# Patient Record
Sex: MALE | Race: WHITE | Employment: UNEMPLOYED | URBAN - METROPOLITAN AREA
[De-identification: names, ages, dates, MRNs, and addresses within clinical notes are randomized per-mention and may not be internally consistent; named-entity substitution may affect disease eponyms.]

---

## 2023-03-26 ENCOUNTER — HOSPITAL ENCOUNTER (EMERGENCY)
Age: 33
Discharge: HOME | End: 2023-03-27
Attending: EMERGENCY MEDICINE
Payer: COMMERCIAL

## 2023-03-26 ENCOUNTER — APPOINTMENT (OUTPATIENT)
Dept: GENERAL RADIOLOGY | Age: 33
End: 2023-03-26
Payer: COMMERCIAL

## 2023-03-26 DIAGNOSIS — T14.8XXA BLOOD BLISTER: ICD-10-CM

## 2023-03-26 DIAGNOSIS — M25.571 ACUTE RIGHT ANKLE PAIN: Primary | ICD-10-CM

## 2023-03-26 PROCEDURE — 6370000000 HC RX 637 (ALT 250 FOR IP): Performed by: EMERGENCY MEDICINE

## 2023-03-26 PROCEDURE — 99283 EMERGENCY DEPT VISIT LOW MDM: CPT

## 2023-03-26 PROCEDURE — 73610 X-RAY EXAM OF ANKLE: CPT

## 2023-03-26 PROCEDURE — 73630 X-RAY EXAM OF FOOT: CPT

## 2023-03-26 PROCEDURE — 73590 X-RAY EXAM OF LOWER LEG: CPT

## 2023-03-26 RX ORDER — OXYCODONE HYDROCHLORIDE AND ACETAMINOPHEN 5; 325 MG/1; MG/1
1 TABLET ORAL ONCE
Status: COMPLETED | OUTPATIENT
Start: 2023-03-26 | End: 2023-03-26

## 2023-03-26 RX ADMIN — OXYCODONE AND ACETAMINOPHEN 1 TABLET: 5; 325 TABLET ORAL at 23:06

## 2023-03-27 VITALS
OXYGEN SATURATION: 99 % | DIASTOLIC BLOOD PRESSURE: 74 MMHG | SYSTOLIC BLOOD PRESSURE: 116 MMHG | TEMPERATURE: 98.1 F | HEART RATE: 92 BPM | HEIGHT: 63 IN | BODY MASS INDEX: 19.49 KG/M2 | RESPIRATION RATE: 16 BRPM | WEIGHT: 110 LBS

## 2023-03-27 PROCEDURE — 6370000000 HC RX 637 (ALT 250 FOR IP): Performed by: EMERGENCY MEDICINE

## 2023-03-27 RX ORDER — OXYCODONE HYDROCHLORIDE AND ACETAMINOPHEN 5; 325 MG/1; MG/1
1 TABLET ORAL EVERY 6 HOURS PRN
Qty: 12 TABLET | Refills: 0 | Status: SHIPPED | OUTPATIENT
Start: 2023-03-27 | End: 2023-03-30

## 2023-03-27 RX ORDER — GINSENG 100 MG
CAPSULE ORAL ONCE
Status: COMPLETED | OUTPATIENT
Start: 2023-03-27 | End: 2023-03-27

## 2023-03-27 RX ADMIN — BACITRACIN: 500 OINTMENT TOPICAL at 00:32

## 2023-03-27 ASSESSMENT — PAIN DESCRIPTION - LOCATION: LOCATION: ANKLE

## 2023-03-27 ASSESSMENT — PAIN SCALES - GENERAL: PAINLEVEL_OUTOF10: 2

## 2023-03-27 ASSESSMENT — PAIN - FUNCTIONAL ASSESSMENT: PAIN_FUNCTIONAL_ASSESSMENT: 0-10

## 2023-03-27 ASSESSMENT — PAIN DESCRIPTION - ORIENTATION: ORIENTATION: RIGHT

## 2023-03-27 NOTE — ED PROVIDER NOTES
any change or worsening increased pain redness swelling warmth any new numb tingling sensations. Patient to follow-up with orthopedics as planned in 2 days    PATIENT REFERRED TO:  YOUR SURGEON    In 2 days      DISCHARGE MEDICATIONS:  New Prescriptions    No medications on file     Controlled Substances Monitoring:     No flowsheet data found.     (Please note that portions of this note were completed with a voice recognition program.  Efforts were made to edit the dictations but occasionally words are mis-transcribed.)    Alesia Song DO (electronically signed)  Attending Emergency Physician            Alesia Song DO  03/27/23 0569

## 2024-01-03 ENCOUNTER — HOSPITAL ENCOUNTER (EMERGENCY)
Dept: HOSPITAL 100 - ED | Age: 34
LOS: 1 days | Discharge: PSYCHIATRIC OR SUBSTANCE USE FACILITY | End: 2024-01-04
Payer: MEDICAID

## 2024-01-03 VITALS — OXYGEN SATURATION: 97 % | RESPIRATION RATE: 16 BRPM | HEART RATE: 80 BPM

## 2024-01-03 VITALS — RESPIRATION RATE: 15 BRPM

## 2024-01-03 VITALS
TEMPERATURE: 97.88 F | RESPIRATION RATE: 22 BRPM | DIASTOLIC BLOOD PRESSURE: 93 MMHG | HEART RATE: 83 BPM | OXYGEN SATURATION: 94 % | SYSTOLIC BLOOD PRESSURE: 129 MMHG

## 2024-01-03 VITALS — RESPIRATION RATE: 16 BRPM | HEART RATE: 81 BPM | OXYGEN SATURATION: 97 %

## 2024-01-03 VITALS — RESPIRATION RATE: 14 BRPM

## 2024-01-03 VITALS — BODY MASS INDEX: 20.5 KG/M2

## 2024-01-03 DIAGNOSIS — Z91.51: ICD-10-CM

## 2024-01-03 DIAGNOSIS — T14.91XA: Primary | ICD-10-CM

## 2024-01-03 DIAGNOSIS — F17.210: ICD-10-CM

## 2024-01-03 DIAGNOSIS — F31.9: ICD-10-CM

## 2024-01-03 LAB
ALCOHOL, BLOOD (MEDICAL)-SERUM: 3 MG/DL
AMPHET UR-MCNC: POSITIVE NG/ML
ANION GAP: 2 (ref 5–15)
BARBITURATE URINE VISTA: NEGATIVE
BENZODIAZEPINE URINE VISTA: NEGATIVE
BUN SERPL-MCNC: 17 MG/DL (ref 7–18)
BUN/CREAT RATIO: 17.8 RATIO (ref 10–20)
CALCIUM SERPL-MCNC: 9.3 MG/DL (ref 8.5–10.1)
CARBON DIOXIDE: 28 MMOL/L (ref 21–32)
CHLORIDE: 109 MMOL/L (ref 98–107)
COCAINE URINE VISTA: NEGATIVE
DEPRECATED RDW RBC: 44.7 FL (ref 35.1–43.9)
DRUG CONFIRMATION TO FOLLOW?: (no result)
ECSTACY URINE VISTA: NEGATIVE
ERYTHROCYTE [DISTWIDTH] IN BLOOD: 14.5 % (ref 11.6–14.6)
EST GLOM FILT RATE - AFR AMER: 115 ML/MIN (ref 60–?)
ESTIMATED CREATININE CLEARANCE: 79.75 ML/MIN
GLUCOSE: 92 MG/DL (ref 74–106)
HCT VFR BLD AUTO: 40 % (ref 40–54)
HGB BLD-MCNC: 13 G/DL (ref 13–16.5)
IMMATURE GRANULOCYTES COUNT: 0.03 X10^3/UL (ref 0–0)
MCV RBC: 84.9 FL (ref 80–94)
MEAN CORP HGB CONC: 32.5 G/DL (ref 32–36)
MEAN PLATELET VOL.: 9.5 FL (ref 6.2–12)
METHADONE URINE VISTA: NEGATIVE
NRBC FLAGGED BY ANALYZER: 0 % (ref 0–5)
PCP UR QL: NEGATIVE
PH UR: 5 [PH]
PLATELET # BLD: 330 K/MM3 (ref 150–450)
POTASSIUM: 4.3 MMOL/L (ref 3.5–5.1)
RBC # BLD AUTO: 4.71 M/MM3 (ref 4.6–6.2)
THC URINE VISTA: POSITIVE
WBC # BLD AUTO: 9.5 K/MM3 (ref 4.4–11)

## 2024-01-03 PROCEDURE — 87811 SARS-COV-2 COVID19 W/OPTIC: CPT

## 2024-01-03 PROCEDURE — 80048 BASIC METABOLIC PNL TOTAL CA: CPT

## 2024-01-03 PROCEDURE — 82550 ASSAY OF CK (CPK): CPT

## 2024-01-03 PROCEDURE — 85025 COMPLETE CBC W/AUTO DIFF WBC: CPT

## 2024-01-03 PROCEDURE — 99283 EMERGENCY DEPT VISIT LOW MDM: CPT

## 2024-01-03 PROCEDURE — 80320 DRUG SCREEN QUANTALCOHOLS: CPT

## 2024-01-03 PROCEDURE — 80307 DRUG TEST PRSMV CHEM ANLYZR: CPT

## 2024-01-03 PROCEDURE — G0480 DRUG TEST DEF 1-7 CLASSES: HCPCS

## 2024-01-04 VITALS
RESPIRATION RATE: 14 BRPM | DIASTOLIC BLOOD PRESSURE: 78 MMHG | SYSTOLIC BLOOD PRESSURE: 110 MMHG | HEART RATE: 64 BPM | OXYGEN SATURATION: 98 %

## 2024-01-04 VITALS
OXYGEN SATURATION: 99 % | HEART RATE: 71 BPM | SYSTOLIC BLOOD PRESSURE: 107 MMHG | DIASTOLIC BLOOD PRESSURE: 66 MMHG | RESPIRATION RATE: 17 BRPM

## 2024-01-04 VITALS — RESPIRATION RATE: 16 BRPM

## 2024-01-04 VITALS — RESPIRATION RATE: 15 BRPM

## 2024-01-04 LAB — CPK TOTAL, CREATINE KINASE: 123 U/L (ref 39–308)
